# Patient Record
Sex: MALE | Race: WHITE | NOT HISPANIC OR LATINO | URBAN - METROPOLITAN AREA
[De-identification: names, ages, dates, MRNs, and addresses within clinical notes are randomized per-mention and may not be internally consistent; named-entity substitution may affect disease eponyms.]

---

## 2017-01-05 ENCOUNTER — OFFICE VISIT - HEALTHEAST (OUTPATIENT)
Dept: UROLOGY | Facility: CLINIC | Age: 62
End: 2017-01-05

## 2017-01-05 ENCOUNTER — AMBULATORY - HEALTHEAST (OUTPATIENT)
Dept: LAB | Facility: CLINIC | Age: 62
End: 2017-01-05

## 2017-01-05 ENCOUNTER — HOSPITAL ENCOUNTER (OUTPATIENT)
Dept: CT IMAGING | Facility: CLINIC | Age: 62
Discharge: HOME | End: 2017-01-05
Attending: UROLOGY

## 2017-01-05 DIAGNOSIS — N20.0 CALCULUS OF KIDNEY: ICD-10-CM

## 2017-01-05 DIAGNOSIS — N20.1 CALCULUS OF URETER: ICD-10-CM

## 2017-01-05 DIAGNOSIS — N20.9 URINARY CALCULUS, UNSPECIFIED: ICD-10-CM

## 2017-01-09 ENCOUNTER — AMBULATORY - HEALTHEAST (OUTPATIENT)
Dept: LAB | Facility: HOSPITAL | Age: 62
End: 2017-01-09

## 2017-01-09 DIAGNOSIS — N20.0 CALCULUS OF KIDNEY: ICD-10-CM

## 2017-01-16 ENCOUNTER — AMBULATORY - HEALTHEAST (OUTPATIENT)
Dept: LAB | Facility: HOSPITAL | Age: 62
End: 2017-01-16

## 2017-01-16 DIAGNOSIS — N20.0 CALCULUS OF KIDNEY: ICD-10-CM

## 2017-01-18 ENCOUNTER — COMMUNICATION - HEALTHEAST (OUTPATIENT)
Dept: UROLOGY | Facility: CLINIC | Age: 62
End: 2017-01-18

## 2017-01-26 ENCOUNTER — OFFICE VISIT - HEALTHEAST (OUTPATIENT)
Dept: UROLOGY | Facility: CLINIC | Age: 62
End: 2017-01-26

## 2017-01-26 DIAGNOSIS — R82.998 HIGH URINE SODIUM: ICD-10-CM

## 2017-01-26 DIAGNOSIS — R82.994 HYPERCALCIURIA: ICD-10-CM

## 2017-01-26 DIAGNOSIS — N20.9 URINARY CALCULUS, UNSPECIFIED: ICD-10-CM

## 2017-01-26 DIAGNOSIS — R82.992 HYPEROXALURIA: ICD-10-CM

## 2017-01-26 DIAGNOSIS — R34 LOW URINE OUTPUT: ICD-10-CM

## 2017-03-13 ENCOUNTER — AMBULATORY - HEALTHEAST (OUTPATIENT)
Dept: LAB | Facility: HOSPITAL | Age: 62
End: 2017-03-13

## 2017-03-13 DIAGNOSIS — R82.992 HYPEROXALURIA: ICD-10-CM

## 2017-03-13 DIAGNOSIS — R82.994 HYPERCALCIURIA: ICD-10-CM

## 2017-03-13 DIAGNOSIS — R34 LOW URINE OUTPUT: ICD-10-CM

## 2017-03-13 DIAGNOSIS — R82.998 HIGH URINE SODIUM: ICD-10-CM

## 2017-03-15 ENCOUNTER — COMMUNICATION - HEALTHEAST (OUTPATIENT)
Dept: UROLOGY | Facility: CLINIC | Age: 62
End: 2017-03-15

## 2017-03-30 ENCOUNTER — OFFICE VISIT - HEALTHEAST (OUTPATIENT)
Dept: UROLOGY | Facility: CLINIC | Age: 62
End: 2017-03-30

## 2017-03-30 DIAGNOSIS — N20.9 URINARY CALCULUS, UNSPECIFIED: ICD-10-CM

## 2018-01-02 ENCOUNTER — RECORDS - HEALTHEAST (OUTPATIENT)
Dept: LAB | Facility: CLINIC | Age: 63
End: 2018-01-02

## 2018-01-02 LAB
ALBUMIN SERPL-MCNC: 3.3 G/DL (ref 3.5–5)
ALP SERPL-CCNC: 80 U/L (ref 45–120)
ALT SERPL W P-5'-P-CCNC: 37 U/L (ref 0–45)
ANION GAP SERPL CALCULATED.3IONS-SCNC: 10 MMOL/L (ref 5–18)
AST SERPL W P-5'-P-CCNC: 19 U/L (ref 0–40)
BILIRUB SERPL-MCNC: 0.6 MG/DL (ref 0–1)
BUN SERPL-MCNC: 19 MG/DL (ref 8–22)
CALCIUM SERPL-MCNC: 8.8 MG/DL (ref 8.5–10.5)
CHLORIDE BLD-SCNC: 105 MMOL/L (ref 98–107)
CHOLEST SERPL-MCNC: 175 MG/DL
CO2 SERPL-SCNC: 23 MMOL/L (ref 22–31)
CREAT SERPL-MCNC: 0.78 MG/DL (ref 0.7–1.3)
ERYTHROCYTE [SEDIMENTATION RATE] IN BLOOD BY WESTERGREN METHOD: 13 MM/HR (ref 0–15)
FASTING STATUS PATIENT QL REPORTED: NO
GFR SERPL CREATININE-BSD FRML MDRD: >60 ML/MIN/1.73M2
GLUCOSE BLD-MCNC: 117 MG/DL (ref 70–125)
HDLC SERPL-MCNC: 47 MG/DL
LDLC SERPL CALC-MCNC: 116 MG/DL
POTASSIUM BLD-SCNC: 4 MMOL/L (ref 3.5–5)
PROT SERPL-MCNC: 6.6 G/DL (ref 6–8)
PSA SERPL-MCNC: 2.3 NG/ML (ref 0–4.5)
RHEUMATOID FACT SERPL-ACNC: <15 IU/ML (ref 0–30)
SODIUM SERPL-SCNC: 138 MMOL/L (ref 136–145)
TRIGL SERPL-MCNC: 61 MG/DL

## 2018-01-03 LAB — B BURGDOR IGG+IGM SER QL: 0.1 INDEX VALUE

## 2018-01-04 LAB — ANA SER QL: 0.9 U

## 2019-11-06 ENCOUNTER — RECORDS - HEALTHEAST (OUTPATIENT)
Dept: LAB | Facility: CLINIC | Age: 64
End: 2019-11-06

## 2019-11-06 LAB
ALBUMIN SERPL-MCNC: 3.7 G/DL (ref 3.5–5)
ALP SERPL-CCNC: 79 U/L (ref 45–120)
ALT SERPL W P-5'-P-CCNC: 17 U/L (ref 0–45)
ANION GAP SERPL CALCULATED.3IONS-SCNC: 8 MMOL/L (ref 5–18)
AST SERPL W P-5'-P-CCNC: 13 U/L (ref 0–40)
BILIRUB SERPL-MCNC: 0.4 MG/DL (ref 0–1)
BUN SERPL-MCNC: 22 MG/DL (ref 8–22)
CALCIUM SERPL-MCNC: 9.2 MG/DL (ref 8.5–10.5)
CHLORIDE BLD-SCNC: 105 MMOL/L (ref 98–107)
CHOLEST SERPL-MCNC: 182 MG/DL
CO2 SERPL-SCNC: 26 MMOL/L (ref 22–31)
CREAT SERPL-MCNC: 0.9 MG/DL (ref 0.7–1.3)
FASTING STATUS PATIENT QL REPORTED: NORMAL
GFR SERPL CREATININE-BSD FRML MDRD: >60 ML/MIN/1.73M2
GLUCOSE BLD-MCNC: 88 MG/DL (ref 70–125)
HDLC SERPL-MCNC: 50 MG/DL
LDLC SERPL CALC-MCNC: 106 MG/DL
POTASSIUM BLD-SCNC: 4.5 MMOL/L (ref 3.5–5)
PROT SERPL-MCNC: 6.8 G/DL (ref 6–8)
PSA SERPL-MCNC: 2.3 NG/ML (ref 0–4.5)
SODIUM SERPL-SCNC: 139 MMOL/L (ref 136–145)
TRIGL SERPL-MCNC: 128 MG/DL

## 2020-04-14 ENCOUNTER — COMMUNICATION - HEALTHEAST (OUTPATIENT)
Dept: UROLOGY | Facility: CLINIC | Age: 65
End: 2020-04-14

## 2020-04-24 ENCOUNTER — RECORDS - HEALTHEAST (OUTPATIENT)
Dept: LAB | Facility: CLINIC | Age: 65
End: 2020-04-24

## 2020-04-24 LAB
ANION GAP SERPL CALCULATED.3IONS-SCNC: 8 MMOL/L (ref 5–18)
BUN SERPL-MCNC: 30 MG/DL (ref 8–22)
CALCIUM SERPL-MCNC: 8.4 MG/DL (ref 8.5–10.5)
CHLORIDE BLD-SCNC: 106 MMOL/L (ref 98–107)
CO2 SERPL-SCNC: 25 MMOL/L (ref 22–31)
CREAT SERPL-MCNC: 0.98 MG/DL (ref 0.7–1.3)
GFR SERPL CREATININE-BSD FRML MDRD: >60 ML/MIN/1.73M2
GLUCOSE BLD-MCNC: 108 MG/DL (ref 70–125)
POTASSIUM BLD-SCNC: 4.1 MMOL/L (ref 3.5–5)
SODIUM SERPL-SCNC: 139 MMOL/L (ref 136–145)

## 2020-04-26 LAB — BACTERIA SPEC CULT: NO GROWTH

## 2020-11-10 ENCOUNTER — RECORDS - HEALTHEAST (OUTPATIENT)
Dept: LAB | Facility: CLINIC | Age: 65
End: 2020-11-10

## 2020-11-10 LAB
CHOLEST SERPL-MCNC: 170 MG/DL
FASTING STATUS PATIENT QL REPORTED: NORMAL
HDLC SERPL-MCNC: 52 MG/DL
LDLC SERPL CALC-MCNC: 107 MG/DL
PSA SERPL-MCNC: 2.4 NG/ML (ref 0–4.5)
TRIGL SERPL-MCNC: 54 MG/DL

## 2021-06-07 NOTE — TELEPHONE ENCOUNTER
Patient called stating that he has been having blood intermittently in his urine.  He has a remote history of kidney stones.  He is not having any other symptoms.  He would like to monitor at this point and will check in with his PCP.  He will call with any changes.  Ana Patiño RN

## 2021-06-08 NOTE — PROGRESS NOTES
Assessment/Plan:        Diagnoses and all orders for this visit:    Calculus of ureter    Calculus of kidney  -     Stone Formation, 24 Hour Urine x2; Standing  -     Renal Function Profile; Future; Expected date: 1/19/17  -     Uric Acid; Future; Expected date: 1/19/17  -     Magnesium; Future; Expected date: 1/19/17    Urinary calculus, unspecified  -     POCT urinalysis dipstick-[facility]    Other orders  -     ranitidine (ZANTAC) 150 MG tablet; Take 150 mg by mouth 2 (two) times a day.      Stone Management Plan  [facility] Stone [facility] 11/14/2016 12/5/2016 1/5/2017   Urinary Tract Infection No suspicion of infection No suspicion of infection No suspicion of infection   Renal Colic Well controlled symptoms Well controlled symptoms Asymptomatic at this time   Renal Failure No suspicion of renal failure No suspicion of renal failure No suspicion of renal failure   Current CT date 11/13/2016 - 1/5/2017   Right sided stones? No - No   R Stone Event No current event No current event No current event   Left sided stones? Yes - No   L Number of ureteral stones 1 - -   L GSD of ureteral stones 12 - -   L Location of ureteral stone Proximal - -   L Number of kidney stones  1 - -   L GSD of kidney stones 4 - 10 - -   L Hydronephrosis Mild - -   L Stone Event New event Established event Resolved event   Diagnosis date 11/13/2016 - -   Initial location of primary symptomatic stone Proximal - -   Initial GSD of primary symptomatic stone 12 - -   Resolved date - - 1/5/2017   Post-op status - No residual stone No residual stone   L Current Plan Clear - -   Clear rationale Poor prognosis - -         Subjective:      HPI  Mr. Guero Tse is a 61 y.o.  male returning to the [facility] [place] for late postoperative follow-up.     He returns status post Left ureteroscopic laser lithotripsy for renal and ureteral stones. He has had no unanticipated events.     He is asymptomatic at present. He denies symptoms  of fever, chills, flank pain, nausea, vomiting, urinary frequency and dysuria.    New CT scan was personally reviewed and demonstrates complete clearance of targeted stone  with resolution of previous hydronephrosis. No current urinary tract stones.    Stone composition was 100% calcium oxalate.     PLAN    62 yo M first time stone former, s/p left ureteroscopic clearance of renal and ureteral stones, asymptomatic.    He would like to initiate stone risk evaluation. Serum stone risk chemistries will be obtained before next visit. Two 24 hour urine collections and dietary journal will be obtained at earliest covenience.     Patient also seen and examined by Clau De La Vega PA-C     ROS   Review of systems is negative except for HPI.    Past Medical History   Diagnosis Date     Chronic kidney disease      kidney stones     GERD (gastroesophageal reflux disease)      Kidney stone      Medical history non-contributory      Neck pain      Osteonecrosis of jaw      Reflux esophagitis        Past Surgical History   Procedure Laterality Date     Ankle surgery Right      broken ankle     Tonsillectomy       Dental surgery       wisdom teeth extracted       Current Outpatient Prescriptions   Medication Sig Dispense Refill     buPROPion (WELLBUTRIN SR) 150 MG 12 hr tablet        ranitidine (ZANTAC) 150 MG tablet Take 150 mg by mouth 2 (two) times a day.       No current facility-administered medications for this visit.        No Known Allergies    Social History     Social History     Marital status:      Spouse name: N/A     Number of children: N/A     Years of education: N/A     Occupational History     [employer]       Social History Main Topics     Smoking status: Current Every Day Smoker     Packs/day: 0.50     Types: Cigarettes     Smokeless tobacco: Not on file     Alcohol use Yes      Comment: Occasionally      Drug use: No     Sexual activity: Not on file     Other Topics Concern     Not on file     Social  History Narrative       Family History   Problem Relation Age of Onset     Cancer Father      lung at last stage of life     Heart disease Father      Cancer Brother      leukemia     Heart disease Maternal Grandfather      Other Brother      benign brain tumor     Objective:      Physical Exam  Vitals:    01/05/17 1049   BP: 120/72   Pulse: 75   Temp: 98  F (36.7  C)     General - well developed, well nourished, appropriate for age. Appears no distress at this time  Abdomen - slender soft, non-tender, no hepatosplenomegaly, no masses.   - no flank tenderness, no suprapubic tenderness, kidney and bladder non-palpable  MSK - normal spinal curvature. no spinal tenderness. normal gait. muscular strength intact.  Psych - oriented to time, place, and person, normal mood and affect.      Labs   Urinalysis POC (Office):  BLOOD UA   Date Value Ref Range Status   01/05/2017 Trace Negative Final   12/05/2016 Large Negative Final   11/14/2016 Large Negative Final     NITRITE, UA   Date Value Ref Range Status   01/05/2017 Negative Negative Final   12/05/2016 Negative Negative Final   11/14/2016 Negative Negative Final   11/13/2016 Negative Negative Final     LEUKOCYTES, UA    Date Value Ref Range Status   01/05/2017 Trace (!) Negative Final   12/05/2016 Moderate (!) Negative Final   11/14/2016 Negative Negative Final     PH UA   Date Value Ref Range Status   01/05/2017 7.0 4.5 - 8.0 Final   12/05/2016 5.5 4.5 - 8.0 Final   11/14/2016 6.0 4.5 - 8.0 Final       Lab Urinalysis:  BLOOD, UA   Date Value Ref Range Status   11/13/2016 Large (!) Negative Final     NITRITE, UA   Date Value Ref Range Status   01/05/2017 Negative Negative Final   12/05/2016 Negative Negative Final   11/14/2016 Negative Negative Final   11/13/2016 Negative Negative Final     LEUKOCYTES, UA   Date Value Ref Range Status   11/13/2016 Small (!) Negative Final     PH, UA   Date Value Ref Range Status   11/13/2016 6.0 4.5 - 8.0 Final

## 2021-06-08 NOTE — PROGRESS NOTES
Assessment/Plan:        Diagnoses and all orders for this visit:    Low urine output  -     Stone Formation, 24 Hour Urine x1; Future; Expected date: 2/9/17  -     24 Hour Urine Specimen Collections (Sodium Restriction) Education  -     Patient Increasing Fluids Education    Hypercalciuria  -     Stone Formation, 24 Hour Urine x1; Future; Expected date: 2/9/17  -     24 Hour Urine Specimen Collections (Sodium Restriction) Education  -     Foods Rich in Sodium Salt Education    High urine sodium  -     Stone Formation, 24 Hour Urine x1; Future; Expected date: 2/9/17  -     24 Hour Urine Specimen Collections (Sodium Restriction) Education  -     Foods Rich in Sodium Salt Education    Hyperoxaluria  -     Stone Formation, 24 Hour Urine x1; Future; Expected date: 2/9/17  -     24 Hour Urine Specimen Collections (Sodium Restriction) Education  -     Low Oxalate Food List Education    Urinary calculus, unspecified  -     POCT urinalysis dipstick-[facility]      Stone Management Plan  [facility] Stone Management 12/5/2016 1/5/2017 1/26/2017   Urinary Tract Infection No suspicion of infection No suspicion of infection No suspicion of infection   Renal Colic Well controlled symptoms Asymptomatic at this time Asymptomatic at this time   Renal Failure No suspicion of renal failure No suspicion of renal failure No suspicion of renal failure   Current CT date - 1/5/2017 -   Right sided stones? - No -   R Stone Event No current event No current event No current event   Left sided stones? - No -   L Number of ureteral stones - - -   L GSD of ureteral stones - - -   L Location of ureteral stone - - -   L Number of kidney stones  - - -   L GSD of kidney stones - - -   L Hydronephrosis - - -   L Stone Event Established event Resolved event No current event   Diagnosis date - - -   Initial location of primary symptomatic stone - - -   Initial GSD of primary symptomatic stone - - -   Resolved date - 1/5/2017 -   Post-op status No residual  stone No residual stone -   L Current Plan - - -   Clear rationale - - -         Subjective:      HPI  Mr. Guero Tse is a 61 y.o.  male returning to the [facility] [place] for review of initial stone risk evaluation.     He is a first time calcium oxalate stone former who has required stone clearance procedures. He has not previously participated in stone risk evaluation. He has identified modifiable stone risks including:  low urine volume, unclassified hypercalciuria, high urine sodium, dietary hyperoxaluria, high sodium diet and high oxalate diet. He has no identified non-modifiable stone risk factors.     He is asymptomatic at present. He denies symptoms of fever, chills, flank pain, nausea, vomiting, urinary frequency and dysuria.     Evaluation of serum lab results demonstrates no significant abnormalities with normal venous calcium and normal uric acid. Two 24 hour urine collections demonstrates moderate low urine volume (1850, 1075 mL), creatinine (1635, 1680 mg), moderate hypercalciuria (374, 298 mg), moderate high urine sodium (174, 169 mmol), citrate (947, 830 mg), mild hyperoxaluria x 1 (44, 29 mg), and pH (6 on both). Dietary journal demonstrates: high sodium consumption and high oxalate consumption.    PLAN    First time calcium oxalate stone former with initial stone prevention workup demonstrating identifiable risk factors of low urine volume, hypercalciuria with elevated urine sodium, and mild dietary hyperoxaluria.    Based on review of findings with the patient, he will repeat a single 24 hour collection after a 4 day sodium restriction diet. He will initiate increased fluid consumption to generate at least 2,000 ml urine daily, initiate restricted daily sodium intake to less than 1,800 mg/day and initiate avoidance of foods with greater than 100 mg/100g oxalate.    Patient also seen and examined by WILL Cabrera   Review of systems is negative except for  HPI.    Past Medical History   Diagnosis Date     Chronic kidney disease      kidney stones     GERD (gastroesophageal reflux disease)      Kidney stone      Medical history non-contributory      Neck pain      Osteonecrosis of jaw      Reflux esophagitis      Past Surgical History   Procedure Laterality Date     Ankle surgery Right      broken ankle     Tonsillectomy       Dental surgery       wisdom teeth extracted     Current Outpatient Prescriptions   Medication Sig Dispense Refill     buPROPion (WELLBUTRIN SR) 150 MG 12 hr tablet        ranitidine (ZANTAC) 150 MG tablet Take 150 mg by mouth 2 (two) times a day.       No current facility-administered medications for this visit.        No Known Allergies    Social History     Social History     Marital status:      Spouse name: N/A     Number of children: N/A     Years of education: N/A     Occupational History     [employer]       Social History Main Topics     Smoking status: Current Every Day Smoker     Packs/day: 0.50     Types: Cigarettes     Smokeless tobacco: Not on file     Alcohol use Yes      Comment: Occasionally      Drug use: No     Sexual activity: Not on file     Other Topics Concern     Not on file     Social History Narrative       Family History   Problem Relation Age of Onset     Cancer Father      lung at last stage of life     Heart disease Father      Cancer Brother      leukemia     Heart disease Maternal Grandfather      Other Brother      benign brain tumor     Objective:      Physical Exam  Vitals:    01/26/17 0907   BP: 120/70   Pulse: 77   Temp: 97.5  F (36.4  C)     General - well developed, well nourished, appropriate for age. Appears no distress at this time  Abdomen - slender soft, non-tender, no hepatosplenomegaly, no masses.   - no flank tenderness, no suprapubic tenderness, kidney and bladder non-palpable  MSK - normal spinal curvature. no spinal tenderness. normal gait. muscular strength intact.  Psych -  oriented to time, place, and person, normal mood and affect.    Labs   Urinalysis POC (Office):  BLOOD UA   Date Value Ref Range Status   01/26/2017 Trace Negative Final   01/05/2017 Trace Negative Final   12/05/2016 Large Negative Final     NITRITE, UA   Date Value Ref Range Status   01/26/2017 Negative Negative Final   01/05/2017 Negative Negative Final   12/05/2016 Negative Negative Final   11/13/2016 Negative Negative Final     LEUKOCYTES, UA    Date Value Ref Range Status   01/26/2017 Negative Negative Final   01/05/2017 Trace (!) Negative Final   12/05/2016 Moderate (!) Negative Final     PH UA   Date Value Ref Range Status   01/26/2017 6.0 4.5 - 8.0 Final   01/05/2017 7.0 4.5 - 8.0 Final   12/05/2016 5.5 4.5 - 8.0 Final       Lab Urinalysis:  BLOOD, UA   Date Value Ref Range Status   11/13/2016 Large (!) Negative Final     NITRITE, UA   Date Value Ref Range Status   01/26/2017 Negative Negative Final   01/05/2017 Negative Negative Final   12/05/2016 Negative Negative Final   11/13/2016 Negative Negative Final     LEUKOCYTES, UA   Date Value Ref Range Status   11/13/2016 Small (!) Negative Final     PH, UA   Date Value Ref Range Status   11/13/2016 6.0 4.5 - 8.0 Final    and Stone prevention labs   Serum chemistries   CREATININE   Date Value Ref Range Status   01/05/2017 0.91 0.70 - 1.30 mg/dL Final   11/13/2016 1.21 0.70 - 1.30 mg/dL Final   08/27/2015 0.91 0.70 - 1.30 mg/dL Final     POTASSIUM   Date Value Ref Range Status   01/05/2017 4.5 3.5 - 5.0 mmol/L Final   11/13/2016 3.7 3.5 - 5.0 mmol/L Final   08/27/2015 4.0 3.5 - 5.0 mmol/L Final     CALCIUM   Date Value Ref Range Status   01/05/2017 9.6 8.5 - 10.5 mg/dL Final   11/13/2016 9.2 8.5 - 10.5 mg/dL Final   08/27/2015 9.3 8.5 - 10.5 mg/dL Final     PHOSPHORUS   Date Value Ref Range Status   01/05/2017 3.6 2.5 - 4.5 mg/dL Final     URIC ACID   Date Value Ref Range Status   01/05/2017 5.0 3.0 - 8.0 mg/dL Final    and 24 hour urine   CALCIUM, 24H URINE    Date Value Ref Range Status   01/16/2017 374 (H) 25 - 300 mg/24hr Final     Comment:       Hypercalciuria >350  Values are for persons with  average daily calcium intake  (600-800 mg/day)   01/09/2017 298 25 - 300 mg/24hr Final     Comment:       Hypercalciuria >350  Values are for persons with  average daily calcium intake  (600-800 mg/day)     SODIUM, 24 HOUR URINE   Date Value Ref Range Status   01/16/2017 174 40 - 217 mmol/24hr Final   01/09/2017 169 40 - 217 mmol/24hr Final     CITRATE, 24 HOUR URINE   Date Value Ref Range Status   01/16/2017 947 434 - 1191 mg/24hr Final     Comment:       Reference Ranges are not  established for 0-19 years  or >60 years of age.   01/09/2017 830 434 - 1191 mg/24hr Final     Comment:       Reference Ranges are not  established for 0-19 years  or >60 years of age.     OXALATE, 24 HOUR URINE   Date Value Ref Range Status   01/16/2017 43.5 7.0 - 44.0 mg/24hr Final   01/09/2017 28.7 7.0 - 44.0 mg/24hr Final     PH, URINE   Date Value Ref Range Status   01/16/2017 6.0 4.5 - 8.0 Final   01/09/2017 6.0 4.5 - 8.0 Final     URINE VOLUME   Date Value Ref Range Status   01/16/2017 1850 mL Final   01/09/2017 1075 mL Final

## 2021-06-09 NOTE — PROGRESS NOTES
Assessment/Plan:        Diagnoses and all orders for this visit:    Urinary calculus, unspecified  -     POCT urinalysis dipstick-[facility]  -     Calcium Oxalate Stone Prevention Education      Stone Management Plan  [facility] 1/5/2017 1/26/2017 3/30/2017   Urinary Tract Infection No suspicion of infection No suspicion of infection No suspicion of infection   Renal Colic Asymptomatic at this time Asymptomatic at this time Asymptomatic at this time   Renal Failure No suspicion of renal failure No suspicion of renal failure No suspicion of renal failure   Current CT date 1/5/2017 - -   Right sided stones? No - -   R Stone Event No current event No current event No current event   Left sided stones? No - -   L Number of ureteral stones - - -   L GSD of ureteral stones - - -   L Location of ureteral stone - - -   L Number of kidney stones  - - -   L GSD of kidney stones - - -   L Hydronephrosis - - -   L Stone Event Resolved event No current event No current event   Diagnosis date - - -   Initial location of primary symptomatic stone - - -   Initial GSD of primary symptomatic stone - - -   Resolved date 1/5/2017 - -   Post-op status No residual stone - -   L Current Plan - - -   Clear rationale - - -             Subjective:      HPI  Mr. Guero Tse is a 61 y.o.  male returning to the [facility] [place] for follow up of his stone disease.    He has done well in the interim. No stone events. He has not been very successful in avoiding salt or oxalate but has been increasing fluid intake.    A fresh 24 hour urine specimen is reviewed which demonstrates no change in oxalate, calcium or sodium excretion but his volume is up to 2600 cc.    We discussed continued efforts for self management and will see him back on a PRN basis.     ROS   Review of systems is negative except for HPI.    Past Medical History:   Diagnosis Date     Chronic kidney disease     kidney stones     GERD  (gastroesophageal reflux disease)      Kidney stone      Medical history non-contributory      Neck pain      Osteonecrosis of jaw      Reflux esophagitis        Past Surgical History:   Procedure Laterality Date     ANKLE SURGERY Right     broken ankle     DENTAL SURGERY      wisdom teeth extracted     TONSILLECTOMY         Current Outpatient Prescriptions   Medication Sig Dispense Refill     buPROPion (WELLBUTRIN SR) 150 MG 12 hr tablet        ranitidine (ZANTAC) 150 MG tablet Take 150 mg by mouth 2 (two) times a day.       No current facility-administered medications for this visit.        No Known Allergies    Social History     Social History     Marital status:      Spouse name: N/A     Number of children: N/A     Years of education: N/A     Occupational History     [employer]       Social History Main Topics     Smoking status: Current Every Day Smoker     Packs/day: 0.50     Types: Cigarettes     Smokeless tobacco: Not on file     Alcohol use Yes      Comment: Occasionally      Drug use: No     Sexual activity: Not on file     Other Topics Concern     Not on file     Social History Narrative       Family History   Problem Relation Age of Onset     Cancer Father      lung at last stage of life     Heart disease Father      Cancer Brother      leukemia     Heart disease Maternal Grandfather      Other Brother      benign brain tumor     Objective:      Physical Exam  Vitals:    03/30/17 0935   BP: 109/71   Pulse: 75   Temp: 97.6  F (36.4  C)     General - well developed, well nourished, appropriate for age. Appears no distress at this time  Abdomen - mildly obese soft, non-tender, no hepatosplenomegaly, no masses.   - no flank tenderness, no suprapubic tenderness, kidney and bladder non-palpable  MSK - normal spinal curvature. no spinal tenderness. normal gait. muscular strength intact.  Psych - oriented to time, place, and person, normal mood and affect.      Labs   Urinalysis POC  (Office):  Blood UA   Date Value Ref Range Status   03/30/2017 Negative Negative Final   01/26/2017 Trace Negative Final   01/05/2017 Trace Negative Final     Nitrite, UA   Date Value Ref Range Status   03/30/2017 Negative Negative Final   01/26/2017 Negative Negative Final   01/05/2017 Negative Negative Final   11/13/2016 Negative Negative Final     Leukocytes, UA    Date Value Ref Range Status   03/30/2017 Negative Negative Final   01/26/2017 Negative Negative Final   01/05/2017 Trace (!) Negative Final     pH UA   Date Value Ref Range Status   03/30/2017 6.0 4.5 - 8.0 Final   01/26/2017 6.0 4.5 - 8.0 Final   01/05/2017 7.0 4.5 - 8.0 Final       Lab Urinalysis:  Blood, UA   Date Value Ref Range Status   11/13/2016 Large (!) Negative Final     Nitrite, UA   Date Value Ref Range Status   03/30/2017 Negative Negative Final   01/26/2017 Negative Negative Final   01/05/2017 Negative Negative Final   11/13/2016 Negative Negative Final     Leukocytes, UA   Date Value Ref Range Status   11/13/2016 Small (!) Negative Final     pH, UA   Date Value Ref Range Status   11/13/2016 6.0 4.5 - 8.0 Final    and Stone prevention labs   Serum chemistries   Creatinine   Date Value Ref Range Status   01/05/2017 0.91 0.70 - 1.30 mg/dL Final   11/13/2016 1.21 0.70 - 1.30 mg/dL Final   08/27/2015 0.91 0.70 - 1.30 mg/dL Final     Potassium   Date Value Ref Range Status   01/05/2017 4.5 3.5 - 5.0 mmol/L Final   11/13/2016 3.7 3.5 - 5.0 mmol/L Final   08/27/2015 4.0 3.5 - 5.0 mmol/L Final     Calcium   Date Value Ref Range Status   01/05/2017 9.6 8.5 - 10.5 mg/dL Final   11/13/2016 9.2 8.5 - 10.5 mg/dL Final   08/27/2015 9.3 8.5 - 10.5 mg/dL Final     Phosphorus   Date Value Ref Range Status   01/05/2017 3.6 2.5 - 4.5 mg/dL Final     Uric Acid   Date Value Ref Range Status   01/05/2017 5.0 3.0 - 8.0 mg/dL Final    and 24 hour urine   Calcium, 24H Urine   Date Value Ref Range Status   03/13/2017 309 (H) 25 - 300 mg/24hr Final     Comment:        Hypercalciuria >350  Values are for persons with  average daily calcium intake  (600-800 mg/day)   01/16/2017 374 (H) 25 - 300 mg/24hr Final     Comment:       Hypercalciuria >350  Values are for persons with  average daily calcium intake  (600-800 mg/day)   01/09/2017 298 25 - 300 mg/24hr Final     Comment:       Hypercalciuria >350  Values are for persons with  average daily calcium intake  (600-800 mg/day)     Sodium, 24 Hour Urine   Date Value Ref Range Status   03/13/2017 122 40 - 217 mmol/24hr Final   01/16/2017 174 40 - 217 mmol/24hr Final   01/09/2017 169 40 - 217 mmol/24hr Final     Citrate, 24 Hour Urine   Date Value Ref Range Status   03/13/2017 858 434 - 1191 mg/24hr Final     Comment:       Reference Ranges are not  established for 0-19 years  or >60 years of age.   01/16/2017 947 434 - 1191 mg/24hr Final     Comment:       Reference Ranges are not  established for 0-19 years  or >60 years of age.   01/09/2017 830 434 - 1191 mg/24hr Final     Comment:       Reference Ranges are not  established for 0-19 years  or >60 years of age.     Oxalate, 24 Hour Urine   Date Value Ref Range Status   03/13/2017 42.1 7.0 - 44.0 mg/24hr Final   01/16/2017 43.5 7.0 - 44.0 mg/24hr Final   01/09/2017 28.7 7.0 - 44.0 mg/24hr Final     pH, Urine   Date Value Ref Range Status   03/13/2017 6.0 4.5 - 8.0 Final   01/16/2017 6.0 4.5 - 8.0 Final   01/09/2017 6.0 4.5 - 8.0 Final     Urine Volume   Date Value Ref Range Status   03/13/2017 2600 mL Final   01/16/2017 1850 mL Final   01/09/2017 1075 mL Final

## 2021-06-15 PROBLEM — N20.9 URINARY CALCULUS, UNSPECIFIED: Status: ACTIVE | Noted: 2017-01-05

## 2021-06-15 PROBLEM — R82.992 HYPEROXALURIA: Status: ACTIVE | Noted: 2017-01-26

## 2021-06-15 PROBLEM — R82.994 HYPERCALCIURIA: Status: ACTIVE | Noted: 2017-01-26

## 2021-06-15 PROBLEM — R82.998 HIGH URINE SODIUM: Status: ACTIVE | Noted: 2017-01-26

## 2021-06-15 PROBLEM — R34 LOW URINE OUTPUT: Status: ACTIVE | Noted: 2017-01-26

## 2021-06-27 ENCOUNTER — HEALTH MAINTENANCE LETTER (OUTPATIENT)
Age: 66
End: 2021-06-27

## 2021-10-17 ENCOUNTER — HEALTH MAINTENANCE LETTER (OUTPATIENT)
Age: 66
End: 2021-10-17

## 2021-11-04 ENCOUNTER — LAB REQUISITION (OUTPATIENT)
Dept: LAB | Facility: CLINIC | Age: 66
End: 2021-11-04

## 2021-11-04 DIAGNOSIS — Z13.220 ENCOUNTER FOR SCREENING FOR LIPOID DISORDERS: ICD-10-CM

## 2021-11-04 DIAGNOSIS — Z13.228 ENCOUNTER FOR SCREENING FOR OTHER METABOLIC DISORDERS: ICD-10-CM

## 2021-11-04 DIAGNOSIS — Z12.5 ENCOUNTER FOR SCREENING FOR MALIGNANT NEOPLASM OF PROSTATE: ICD-10-CM

## 2021-11-04 PROCEDURE — G0103 PSA SCREENING: HCPCS | Performed by: FAMILY MEDICINE

## 2021-11-04 PROCEDURE — 82040 ASSAY OF SERUM ALBUMIN: CPT | Performed by: FAMILY MEDICINE

## 2021-11-04 PROCEDURE — 80061 LIPID PANEL: CPT | Mod: ORL | Performed by: FAMILY MEDICINE

## 2021-11-04 PROCEDURE — 80053 COMPREHEN METABOLIC PANEL: CPT | Mod: ORL | Performed by: FAMILY MEDICINE

## 2021-11-05 LAB
ALBUMIN SERPL-MCNC: 3.6 G/DL (ref 3.5–5)
ALP SERPL-CCNC: 73 U/L (ref 45–120)
ALT SERPL W P-5'-P-CCNC: 14 U/L (ref 0–45)
ANION GAP SERPL CALCULATED.3IONS-SCNC: 9 MMOL/L (ref 5–18)
AST SERPL W P-5'-P-CCNC: 24 U/L (ref 0–40)
BILIRUB SERPL-MCNC: 0.8 MG/DL (ref 0–1)
BUN SERPL-MCNC: 17 MG/DL (ref 8–22)
CALCIUM SERPL-MCNC: 9 MG/DL (ref 8.5–10.5)
CHLORIDE BLD-SCNC: 106 MMOL/L (ref 98–107)
CHOLEST SERPL-MCNC: 162 MG/DL
CO2 SERPL-SCNC: 24 MMOL/L (ref 22–31)
CREAT SERPL-MCNC: 0.88 MG/DL (ref 0.7–1.3)
GFR SERPL CREATININE-BSD FRML MDRD: >90 ML/MIN/1.73M2
GLUCOSE BLD-MCNC: 90 MG/DL (ref 70–125)
HDLC SERPL-MCNC: 54 MG/DL
LDLC SERPL CALC-MCNC: 99 MG/DL
POTASSIUM BLD-SCNC: 4 MMOL/L (ref 3.5–5)
PROT SERPL-MCNC: 6.5 G/DL (ref 6–8)
PSA SERPL-MCNC: 2.88 UG/L (ref 0–4.5)
SODIUM SERPL-SCNC: 139 MMOL/L (ref 136–145)
TRIGL SERPL-MCNC: 44 MG/DL

## 2022-02-06 ENCOUNTER — HEALTH MAINTENANCE LETTER (OUTPATIENT)
Age: 67
End: 2022-02-06

## 2022-10-02 ENCOUNTER — HEALTH MAINTENANCE LETTER (OUTPATIENT)
Age: 67
End: 2022-10-02

## 2022-11-30 ENCOUNTER — LAB REQUISITION (OUTPATIENT)
Dept: LAB | Facility: CLINIC | Age: 67
End: 2022-11-30
Payer: COMMERCIAL

## 2022-11-30 DIAGNOSIS — Z13.220 ENCOUNTER FOR SCREENING FOR LIPOID DISORDERS: ICD-10-CM

## 2022-11-30 DIAGNOSIS — Z12.5 ENCOUNTER FOR SCREENING FOR MALIGNANT NEOPLASM OF PROSTATE: ICD-10-CM

## 2022-11-30 DIAGNOSIS — Z13.228 ENCOUNTER FOR SCREENING FOR OTHER METABOLIC DISORDERS: ICD-10-CM

## 2022-11-30 LAB
ALBUMIN SERPL BCG-MCNC: 4 G/DL (ref 3.5–5.2)
ALP SERPL-CCNC: 79 U/L (ref 40–129)
ALT SERPL W P-5'-P-CCNC: 15 U/L (ref 10–50)
ANION GAP SERPL CALCULATED.3IONS-SCNC: 11 MMOL/L (ref 7–15)
AST SERPL W P-5'-P-CCNC: 15 U/L (ref 10–50)
BILIRUB SERPL-MCNC: 0.5 MG/DL
BUN SERPL-MCNC: 22 MG/DL (ref 8–23)
CALCIUM SERPL-MCNC: 9.3 MG/DL (ref 8.8–10.2)
CHLORIDE SERPL-SCNC: 103 MMOL/L (ref 98–107)
CHOLEST SERPL-MCNC: 185 MG/DL
CREAT SERPL-MCNC: 1.08 MG/DL (ref 0.67–1.17)
DEPRECATED HCO3 PLAS-SCNC: 25 MMOL/L (ref 22–29)
GFR SERPL CREATININE-BSD FRML MDRD: 76 ML/MIN/1.73M2
GLUCOSE SERPL-MCNC: 85 MG/DL (ref 70–99)
HDLC SERPL-MCNC: 57 MG/DL
LDLC SERPL CALC-MCNC: 117 MG/DL
NONHDLC SERPL-MCNC: 128 MG/DL
POTASSIUM SERPL-SCNC: 4.3 MMOL/L (ref 3.4–5.3)
PROT SERPL-MCNC: 6.5 G/DL (ref 6.4–8.3)
PSA SERPL-MCNC: 3.09 NG/ML (ref 0–4.5)
SODIUM SERPL-SCNC: 139 MMOL/L (ref 136–145)
TRIGL SERPL-MCNC: 53 MG/DL

## 2022-11-30 PROCEDURE — 80061 LIPID PANEL: CPT | Mod: ORL | Performed by: FAMILY MEDICINE

## 2022-11-30 PROCEDURE — G0103 PSA SCREENING: HCPCS | Mod: ORL | Performed by: FAMILY MEDICINE

## 2022-11-30 PROCEDURE — 80053 COMPREHEN METABOLIC PANEL: CPT | Mod: ORL | Performed by: FAMILY MEDICINE

## 2023-02-11 ENCOUNTER — HEALTH MAINTENANCE LETTER (OUTPATIENT)
Age: 68
End: 2023-02-11

## 2023-12-06 ENCOUNTER — LAB REQUISITION (OUTPATIENT)
Dept: LAB | Facility: CLINIC | Age: 68
End: 2023-12-06
Payer: COMMERCIAL

## 2023-12-06 DIAGNOSIS — Z13.228 ENCOUNTER FOR SCREENING FOR OTHER METABOLIC DISORDERS: ICD-10-CM

## 2023-12-06 DIAGNOSIS — Z13.220 ENCOUNTER FOR SCREENING FOR LIPOID DISORDERS: ICD-10-CM

## 2023-12-06 DIAGNOSIS — Z12.5 ENCOUNTER FOR SCREENING FOR MALIGNANT NEOPLASM OF PROSTATE: ICD-10-CM

## 2023-12-06 LAB
ALBUMIN SERPL BCG-MCNC: 3.9 G/DL (ref 3.5–5.2)
ALP SERPL-CCNC: 69 U/L (ref 40–150)
ALT SERPL W P-5'-P-CCNC: 13 U/L (ref 0–70)
ANION GAP SERPL CALCULATED.3IONS-SCNC: 12 MMOL/L (ref 7–15)
AST SERPL W P-5'-P-CCNC: 18 U/L (ref 0–45)
BILIRUB SERPL-MCNC: 0.5 MG/DL
BUN SERPL-MCNC: 24.4 MG/DL (ref 8–23)
CALCIUM SERPL-MCNC: 9.2 MG/DL (ref 8.8–10.2)
CHLORIDE SERPL-SCNC: 105 MMOL/L (ref 98–107)
CHOLEST SERPL-MCNC: 180 MG/DL
CREAT SERPL-MCNC: 1.09 MG/DL (ref 0.67–1.17)
DEPRECATED HCO3 PLAS-SCNC: 25 MMOL/L (ref 22–29)
EGFRCR SERPLBLD CKD-EPI 2021: 74 ML/MIN/1.73M2
FASTING STATUS PATIENT QL REPORTED: ABNORMAL
GLUCOSE SERPL-MCNC: 89 MG/DL (ref 70–99)
HDLC SERPL-MCNC: 49 MG/DL
LDLC SERPL CALC-MCNC: 112 MG/DL
NONHDLC SERPL-MCNC: 131 MG/DL
POTASSIUM SERPL-SCNC: 4.6 MMOL/L (ref 3.4–5.3)
PROT SERPL-MCNC: 6.9 G/DL (ref 6.4–8.3)
PSA SERPL DL<=0.01 NG/ML-MCNC: 3.9 NG/ML (ref 0–4.5)
SODIUM SERPL-SCNC: 142 MMOL/L (ref 135–145)
TRIGL SERPL-MCNC: 97 MG/DL

## 2023-12-06 PROCEDURE — G0103 PSA SCREENING: HCPCS | Mod: ORL | Performed by: FAMILY MEDICINE

## 2023-12-06 PROCEDURE — 80061 LIPID PANEL: CPT | Mod: ORL | Performed by: FAMILY MEDICINE

## 2023-12-06 PROCEDURE — 80053 COMPREHEN METABOLIC PANEL: CPT | Mod: ORL | Performed by: FAMILY MEDICINE

## 2024-03-09 ENCOUNTER — HEALTH MAINTENANCE LETTER (OUTPATIENT)
Age: 69
End: 2024-03-09

## 2024-10-16 ENCOUNTER — LAB REQUISITION (OUTPATIENT)
Dept: LAB | Facility: CLINIC | Age: 69
End: 2024-10-16
Payer: COMMERCIAL

## 2024-10-16 DIAGNOSIS — R31.9 HEMATURIA, UNSPECIFIED: ICD-10-CM

## 2024-10-16 DIAGNOSIS — R82.998 OTHER ABNORMAL FINDINGS IN URINE: ICD-10-CM

## 2024-10-16 PROCEDURE — 80048 BASIC METABOLIC PNL TOTAL CA: CPT | Mod: ORL

## 2024-10-16 PROCEDURE — 87086 URINE CULTURE/COLONY COUNT: CPT | Mod: ORL

## 2024-10-17 LAB
ANION GAP SERPL CALCULATED.3IONS-SCNC: 11 MMOL/L (ref 7–15)
BUN SERPL-MCNC: 23 MG/DL (ref 8–23)
CALCIUM SERPL-MCNC: 8.9 MG/DL (ref 8.8–10.4)
CHLORIDE SERPL-SCNC: 106 MMOL/L (ref 98–107)
CREAT SERPL-MCNC: 1 MG/DL (ref 0.67–1.17)
EGFRCR SERPLBLD CKD-EPI 2021: 82 ML/MIN/1.73M2
GLUCOSE SERPL-MCNC: 98 MG/DL (ref 70–99)
HCO3 SERPL-SCNC: 21 MMOL/L (ref 22–29)
POTASSIUM SERPL-SCNC: 3.8 MMOL/L (ref 3.4–5.3)
SODIUM SERPL-SCNC: 138 MMOL/L (ref 135–145)

## 2024-10-18 LAB — BACTERIA UR CULT: NO GROWTH

## 2024-12-09 ENCOUNTER — LAB REQUISITION (OUTPATIENT)
Dept: LAB | Facility: CLINIC | Age: 69
End: 2024-12-09
Payer: COMMERCIAL

## 2024-12-09 LAB
ALBUMIN SERPL BCG-MCNC: 4 G/DL (ref 3.5–5.2)
ALP SERPL-CCNC: 87 U/L (ref 40–150)
ALT SERPL W P-5'-P-CCNC: 14 U/L (ref 0–70)
ANION GAP SERPL CALCULATED.3IONS-SCNC: 12 MMOL/L (ref 7–15)
AST SERPL W P-5'-P-CCNC: 15 U/L (ref 0–45)
BILIRUB SERPL-MCNC: 0.4 MG/DL
BUN SERPL-MCNC: 19.5 MG/DL (ref 8–23)
CALCIUM SERPL-MCNC: 9.3 MG/DL (ref 8.8–10.4)
CHLORIDE SERPL-SCNC: 105 MMOL/L (ref 98–107)
CHOLEST SERPL-MCNC: 185 MG/DL
CREAT SERPL-MCNC: 0.93 MG/DL (ref 0.67–1.17)
EGFRCR SERPLBLD CKD-EPI 2021: 89 ML/MIN/1.73M2
FASTING STATUS PATIENT QL REPORTED: NO
FASTING STATUS PATIENT QL REPORTED: NO
GLUCOSE SERPL-MCNC: 100 MG/DL (ref 70–99)
HCO3 SERPL-SCNC: 21 MMOL/L (ref 22–29)
HDLC SERPL-MCNC: 56 MG/DL
LDLC SERPL CALC-MCNC: 118 MG/DL
NONHDLC SERPL-MCNC: 129 MG/DL
POTASSIUM SERPL-SCNC: 4.2 MMOL/L (ref 3.4–5.3)
PROT SERPL-MCNC: 7 G/DL (ref 6.4–8.3)
PSA SERPL DL<=0.01 NG/ML-MCNC: 3.5 NG/ML (ref 0–4.5)
SODIUM SERPL-SCNC: 138 MMOL/L (ref 135–145)
TRIGL SERPL-MCNC: 53 MG/DL